# Patient Record
Sex: MALE | Race: AMERICAN INDIAN OR ALASKA NATIVE
[De-identification: names, ages, dates, MRNs, and addresses within clinical notes are randomized per-mention and may not be internally consistent; named-entity substitution may affect disease eponyms.]

---
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
## 2022-01-11 ENCOUNTER — HOSPITAL ENCOUNTER (OUTPATIENT)
Dept: HOSPITAL 5 - MRI | Age: 22
Discharge: HOME | End: 2022-01-11
Attending: ORTHOPAEDIC SURGERY
Payer: COMMERCIAL

## 2022-01-11 DIAGNOSIS — M25.561: ICD-10-CM
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
## 2022-01-11 DIAGNOSIS — S72.431A: Primary | ICD-10-CM

## 2022-01-11 DIAGNOSIS — S80.01XA: ICD-10-CM
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
## 2022-01-11 DIAGNOSIS — S83.31XA: ICD-10-CM
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
## 2022-01-11 PROCEDURE — 73721 MRI JNT OF LWR EXTRE W/O DYE: CPT
